# Patient Record
Sex: MALE | Race: WHITE | URBAN - METROPOLITAN AREA
[De-identification: names, ages, dates, MRNs, and addresses within clinical notes are randomized per-mention and may not be internally consistent; named-entity substitution may affect disease eponyms.]

---

## 2023-01-17 ENCOUNTER — APPOINTMENT (OUTPATIENT)
Dept: URBAN - METROPOLITAN AREA SURGERY 9 | Age: 63
Setting detail: DERMATOLOGY
End: 2023-01-18

## 2023-01-17 DIAGNOSIS — L82.1 OTHER SEBORRHEIC KERATOSIS: ICD-10-CM

## 2023-01-17 PROCEDURE — OTHER MIPS QUALITY: OTHER

## 2023-01-17 PROCEDURE — OTHER REASSURANCE: OTHER

## 2023-01-17 PROCEDURE — 99202 OFFICE O/P NEW SF 15 MIN: CPT

## 2023-01-17 ASSESSMENT — LOCATION ZONE DERM: LOCATION ZONE: SCALP

## 2023-01-17 ASSESSMENT — LOCATION DETAILED DESCRIPTION DERM: LOCATION DETAILED: LEFT CENTRAL FRONTAL SCALP

## 2023-01-17 ASSESSMENT — LOCATION SIMPLE DESCRIPTION DERM: LOCATION SIMPLE: SCALP

## 2023-01-17 NOTE — PROCEDURE: REASSURANCE
Additional Note: Dark, waxy, stuck-on plaque with milia-like cysts and dilated follicular openings.
Hide Include Location In Plan Question?: No
Detail Level: Detailed

## 2023-01-17 NOTE — HPI: SKIN LESION
What Type Of Note Output Would You Prefer (Optional)?: Standard Output
How Severe Is Your Skin Lesion?: mild
Is This A New Presentation, Or A Follow-Up?: Skin Lesion
Additional History: Pt reports that he had another one , which fell off his right temple .